# Patient Record
Sex: MALE | Race: WHITE | Employment: FULL TIME | URBAN - METROPOLITAN AREA
[De-identification: names, ages, dates, MRNs, and addresses within clinical notes are randomized per-mention and may not be internally consistent; named-entity substitution may affect disease eponyms.]

---

## 2018-05-16 ENCOUNTER — TELEPHONE (OUTPATIENT)
Dept: FAMILY MEDICINE | Facility: CLINIC | Age: 68
End: 2018-05-16

## 2018-05-16 NOTE — TELEPHONE ENCOUNTER
Pt has apt 7/30  Would like to come in a week before for labs    Please call pt when the lab orders so he can make lab appt

## 2018-05-16 NOTE — TELEPHONE ENCOUNTER
With my new job at [employer], I have not been ordering pre-appt lab tests for patients that I have not seen at [facility].  For this patient, he should keep his planned evaluation appt and we can do labs on the day of his appt or another day (afterwards).  Thanks.    SHARON Riley MD  [facility]/[place]

## 2018-05-16 NOTE — TELEPHONE ENCOUNTER
Dr. Riley:    Would you like for patient to come into clinic for Lab Only appointment prior to being seen with you on 7/30/18?  Patient is requesting labs to be ordered.    Thank you!    Kat Barnes RN

## 2018-07-30 ENCOUNTER — OFFICE VISIT (OUTPATIENT)
Dept: ENDOCRINOLOGY | Facility: CLINIC | Age: 68
End: 2018-07-30
Payer: COMMERCIAL

## 2018-07-30 VITALS
DIASTOLIC BLOOD PRESSURE: 79 MMHG | BODY MASS INDEX: 25.76 KG/M2 | HEIGHT: 71 IN | WEIGHT: 184 LBS | HEART RATE: 62 BPM | SYSTOLIC BLOOD PRESSURE: 152 MMHG

## 2018-07-30 DIAGNOSIS — E78.1 HYPERTRIGLYCERIDEMIA: Primary | ICD-10-CM

## 2018-07-30 DIAGNOSIS — R73.01 IFG (IMPAIRED FASTING GLUCOSE): ICD-10-CM

## 2018-07-30 DIAGNOSIS — Z86.39 HISTORY OF HYPERPARATHYROIDISM: ICD-10-CM

## 2018-07-30 PROCEDURE — 99214 OFFICE O/P EST MOD 30 MIN: CPT | Performed by: INTERNAL MEDICINE

## 2018-07-30 RX ORDER — CETIRIZINE HYDROCHLORIDE 10 MG/1
10 TABLET ORAL
COMMUNITY

## 2018-07-30 RX ORDER — ALBUTEROL SULFATE 90 UG/1
2 AEROSOL, METERED RESPIRATORY (INHALATION) EVERY 6 HOURS
COMMUNITY

## 2018-07-30 NOTE — PROGRESS NOTES
Name: Remberto Montague is a 68 year old man, self-referred for evaluation of hyperlipidemia  Previously seen by me at my former clinic ([facility]), here to establish care with [facility].    HPI:  Recent issues:  Here for f/u endocrinology evaluation  Feeling well overall, but recalls having a prostate nodule biopsy 2017, benign result        Parathyroid:  History of hyperparathyroidism (HPT)  ... Evaluation, lab tests indicated HPT  Previous history kidney stones:  none  1992. Parathyroid surgery at [facility]  Subsequent calcium and PTH levels normal  Previous [facility] chemistry labs include:          Hyperlipidemia:  History of hypertriglyceridemia  Has taken gemfibrozil (Lopid) and also tried fenofibrate  History of vascular disease:  None  Previous pancreatitis:  None  History of diabetes:  None, but previous IFG  Fam Hx vascular disease:  Sister?  No lipid med use for several years  Recent [facility] lipid labs include:        Had seen Deandre Yun, caesar Moody[facility]   Now sees Dr. JOSÉ MIGUEL Sidhu[facility] for gen med evaluations    PMH/PSH:  Past Medical History:   Diagnosis Date     Backache, unspecified      Other and unspecified hyperlipidemia      Tobacco use disorder      No past surgical history on file.    Family Hx:  Family History   Problem Relation Age of Onset     Cancer Mother      Cancer Father      HEART DISEASE Paternal Grandmother      Cancer - colorectal Paternal Grandfather      Diabetes Sister      HEART DISEASE Sister          Social Hx:  Social History     Social History     Marital status: Single     Spouse name: N/A     Number of children: N/A     Years of education: N/A     Occupational History     Not on file.     Social History Main Topics     Smoking status: Former Smoker     Quit date: 7/19/1998     Smokeless tobacco: Never Used     Alcohol use Yes      Comment: occasionally     Drug use: No     Sexual activity: Yes  "    Other Topics Concern     Parent/Sibling W/ Cabg, Mi Or Angioplasty Before 65f 55m? Yes     sister, paternal grandmother     Social History Narrative          MEDICATIONS:  has a current medication list which includes the following prescription(s): albuterol, alfuzosin, aspirin, cetirizine, vitamin d, flonase, and prilosec otc.    ROS:     ROS: 10 point ROS neg other than the symptoms noted above in the HPI.    GENERAL: no fatigue, wt stable; denies fevers, chills, night sweats.   HEENT: no dysphagia, odonophagia, diplopia, neck pain  THYROID:  no apparent hyper or hypothyroid symptoms  CV: no chest pain, pressure, palpitations  LUNGS: no SOB, LAROSE, cough, wheezing   ABDOMEN: no diarrhea, constipation, abdominal pain  EXTREMITIES: no rashes, ulcers, edema  NEUROLOGY: no headaches, denies changes in vision, tingling, extremitiy numbness   MSK: no muscle aches or pains, weakness  SKIN: no rashes or lesions  : occasional nocturia  PSYCH:  stable mood, no significant anxiety or depression  ENDOCRINE: no heat or cold intolerance    Physical Exam   VS: /79 (BP Location: Right arm, Cuff Size: Adult Regular)  Pulse 62  Ht 1.791 m (5' 10.5\")  Wt 83.5 kg (184 lb)  BMI 26.03 kg/m2  GENERAL: AXOX3, NAD, well dressed, answering questions appropriately, appears stated age.  THYROID:  normal gland, no apparent nodules or goiter  HEENT: neck non-tender, no exopthalmous, no proptosis, EOMI  CV: RRR, no rubs, gallops, no murmurs  LUNGS: CTAB, no wheezes, rales, or ronchi  ABDOMEN: soft, nontender, nondistended  EXTREMITIES: no edema, +pedal pulses, no lesions  NEUROLOGY: CN grossly intact, + DTR lower extremity, no tremors  MSK: grossly intact  SKIN: facial beard; no rashes, no lesions    LABS:    All pertinent notes, labs, and images personally reviewed by me.     A/P:  Encounter Diagnoses   Name Primary?     Hypertriglyceridemia Yes     History of hyperparathyroidism      IFG (impaired fasting glucose)  "       Comments:  Reviewed health history and hyperlipidemia issues.    Plan:  Discussed general issues with the hyperlipidemia diagnosis and management  We have discussed fasting lab tests used to assess patient lipid levels  We discussed potential vascular health risks of hypercholesterolemia  Discussed importance of screening for hyperglycemia and T2DM  Reviewed medication treatment options, dosing, potential medication side effects    Recommend:  Continue diet, exercise, weight management plan  Needs future lab appt for fasting lab tests  Would not start lipid lowering or diabetes medication at this time  Contact our office or PCP if questions about evaluation and management     Patient to follow up with Primary Care provider regarding elevated blood pressure.  Addressed patient questions today    Future labs ordered today:   Orders Placed This Encounter   Procedures     Hemoglobin A1c     Glucose     Lipid panel reflex to direct LDL Fasting     Calcium     Parathyroid Hormone Intact     Radiology/Consults ordered today: None    More than 50% of the time spent with Mr. Montague on counseling / coordinating his care.  Total appointment time was 30 minutes.    Follow-up:  1 yr    James Riley MD  Endocrinology  [facility]

## 2018-07-30 NOTE — MR AVS SNAPSHOT
After Visit Summary   7/30/2018    Remberto Montague    MRN: [id number]           Patient Information     Date Of Birth          1950        Visit Information        Provider Department      7/30/2018 2:00 PM James Riley MD [facility]        Today's Diagnoses     Hypertriglyceridemia    -  1    History of hyperparathyroidism        IFG (impaired fasting glucose)           Follow-ups after your visit        Follow-up notes from your care team     Return in about 1 year (around 7/30/2019).      Your next 10 appointments already scheduled     Aug 02, 2018  8:00 AM CDT   LAB with [facility]   [facility] ([facility]    [address] [address] [phone]           Please do not eat 10-12 hours before your appointment if you are coming in fasting for labs on lipids, cholesterol, or glucose (sugar). This does not apply to pregnant women. Water, hot tea and black coffee (with nothing added) are okay. Do not drink other fluids, diet soda or chew gum.              Future tests that were ordered for you today     Open Future Orders        Priority Expected Expires Ordered    Hemoglobin A1c Routine  7/30/2019 7/30/2018    Glucose Routine  7/30/2019 7/30/2018    Lipid panel reflex to direct LDL Fasting Routine  7/30/2019 7/30/2018    Calcium Routine  7/30/2019 7/30/2018    Parathyroid Hormone Intact Routine  7/30/2019 7/30/2018            Who to contact     If you have questions or need follow up information about today's clinic visit or your schedule please contact [facility] directly at [phone].  Normal or non-critical lab and imaging results will be communicated to you by MyChart, letter or phone within 4 business days after the clinic has received the results. If you do not hear from us within 7 days, please contact the clinic through MyChart or phone. If you have a critical or abnormal lab result, we will notify you by  "phone as soon as possible.  Submit refill requests through [employer] or call your pharmacy and they will forward the refill request to us. Please allow 3 business days for your refill to be completed.          Additional Information About Your Visit        [employer] Information     [employer] lets you send messages to your doctor, view your test results, renew your prescriptions, schedule appointments and more. To sign up, go to [email or web address] . Click on \"Log in\" on the left side of the screen, which will take you to the Welcome page. Then click on \"Sign up Now\" on the right side of the page.     You will be asked to enter the access code listed below, as well as some personal information. Please follow the directions to create your username and password.     Your access code is: [email or web address]  Expires: 10/28/2018  1:31 PM     Your access code will  in 90 days. If you need help or a new code, please call your [place] clinic or [phone].        Care EveryWhere ID     This is your Care EveryWhere ID. This could be used by other organizations to access your [place] medical records  [id number]        Your Vitals Were     Pulse Height BMI (Body Mass Index)             62 1.791 m (5' 10.5\") 26.03 kg/m2          Blood Pressure from Last 3 Encounters:   18 152/79   10/31/11 122/80   11 132/74    Weight from Last 3 Encounters:   18 83.5 kg (184 lb)   10/31/11 81.6 kg (180 lb)   11 81.7 kg (180 lb 3.2 oz)               Primary Care Provider Office Phone # Fax #    Duran Barrett [phone]       [facility] [address] [place] [address]        Equal Access to Services     SERGIO PICHARDO : Akbar Jacobson, wasebas em, qaybta kaalari perrin, paola earl. [facility] [phone].    ATENCIÓN: Si habla español, tiene a vidales disposición servicios gratuitos de asistencia lingüística. Llame al " [phone].    We comply with applicable federal civil rights laws and [place] laws. We do not discriminate on the basis of race, color, national origin, age, disability, sex, sexual orientation, or gender identity.            Thank you!     Thank you for choosing [facility]  for your care. Our goal is always to provide you with excellent care. Hearing back from our patients is one way we can continue to improve our services. Please take a few minutes to complete the written survey that you may receive in the mail after your visit with us. Thank you!             Your Updated Medication List - Protect others around you: Learn how to safely use, store and throw away your medicines at www.disposemymeds.org.          This list is accurate as of 7/30/18  2:37 PM.  Always use your most recent med list.                   Brand Name Dispense Instructions for use Diagnosis    albuterol 108 (90 Base) MCG/ACT Inhaler    PROAIR HFA/PROVENTIL HFA/VENTOLIN HFA     Inhale 2 puffs into the lungs every 6 hours        alfuzosin 10 MG 24 hr tablet    UROXATRAL    30 tablet    Take  by mouth. 1 TABLET DAILY AFTER opal MEAL for prostate    Hypertrophy of prostate with urinary obstruction and other lower urinary tract symptoms (LUTS)       ASPIRIN 81 MG Cpep      1 CAPSULE DAILY        cetirizine 10 MG tablet    zyrTEC     Take 10 mg by mouth        FLONASE 50 MCG/DOSE nasal spray     1 Bottle    INHALE 2 SPRAYS IN EACH NOSTRIL ONCE DAILY    Allergic rhinitis due to other allergen       priLOSEC OTC 20 MG tablet   Generic drug:  omeprazole      1 TABLET DAILY        vitamin D 1000 units capsule      Take 1 capsule by mouth daily.

## 2018-08-02 DIAGNOSIS — R73.01 IFG (IMPAIRED FASTING GLUCOSE): ICD-10-CM

## 2018-08-02 DIAGNOSIS — Z86.39 HISTORY OF HYPERPARATHYROIDISM: ICD-10-CM

## 2018-08-02 DIAGNOSIS — E78.1 HYPERTRIGLYCERIDEMIA: ICD-10-CM

## 2018-08-02 LAB
CALCIUM SERPL-MCNC: 9 MG/DL (ref 8.5–10.1)
CHOLEST SERPL-MCNC: 140 MG/DL
GLUCOSE SERPL-MCNC: 108 MG/DL (ref 70–99)
HBA1C MFR BLD: 6 % (ref 0–5.6)
HDLC SERPL-MCNC: 29 MG/DL
LDLC SERPL CALC-MCNC: 69 MG/DL
NONHDLC SERPL-MCNC: 111 MG/DL
PTH-INTACT SERPL-MCNC: 40 PG/ML (ref 18–80)
TRIGL SERPL-MCNC: 210 MG/DL

## 2018-08-02 PROCEDURE — 83036 HEMOGLOBIN GLYCOSYLATED A1C: CPT | Performed by: INTERNAL MEDICINE

## 2018-08-02 PROCEDURE — 80061 LIPID PANEL: CPT | Performed by: INTERNAL MEDICINE

## 2018-08-02 PROCEDURE — 82947 ASSAY GLUCOSE BLOOD QUANT: CPT | Performed by: INTERNAL MEDICINE

## 2018-08-02 PROCEDURE — 83970 ASSAY OF PARATHORMONE: CPT | Performed by: INTERNAL MEDICINE

## 2018-08-02 PROCEDURE — 82310 ASSAY OF CALCIUM: CPT | Performed by: INTERNAL MEDICINE

## 2018-08-02 PROCEDURE — 36415 COLL VENOUS BLD VENIPUNCTURE: CPT | Performed by: INTERNAL MEDICINE

## 2019-10-01 ENCOUNTER — HEALTH MAINTENANCE LETTER (OUTPATIENT)
Age: 69
End: 2019-10-01

## 2019-12-15 ENCOUNTER — HEALTH MAINTENANCE LETTER (OUTPATIENT)
Age: 69
End: 2019-12-15

## 2021-01-15 ENCOUNTER — HEALTH MAINTENANCE LETTER (OUTPATIENT)
Age: 71
End: 2021-01-15

## 2021-09-04 ENCOUNTER — HEALTH MAINTENANCE LETTER (OUTPATIENT)
Age: 71
End: 2021-09-04

## 2021-10-06 ENCOUNTER — HOSPITAL ENCOUNTER (OUTPATIENT)
Dept: BONE DENSITY | Facility: CLINIC | Age: 71
End: 2021-10-06
Attending: INTERNAL MEDICINE
Payer: COMMERCIAL

## 2021-10-06 DIAGNOSIS — E21.0 PRIMARY HYPERPARATHYROIDISM (H): ICD-10-CM

## 2021-10-06 PROCEDURE — 77080 DXA BONE DENSITY AXIAL: CPT

## 2021-10-06 PROCEDURE — 77081 DXA BONE DENSITY APPENDICULR: CPT

## 2022-02-19 ENCOUNTER — HEALTH MAINTENANCE LETTER (OUTPATIENT)
Age: 72
End: 2022-02-19

## 2022-10-16 ENCOUNTER — HEALTH MAINTENANCE LETTER (OUTPATIENT)
Age: 72
End: 2022-10-16

## 2023-03-26 ENCOUNTER — HEALTH MAINTENANCE LETTER (OUTPATIENT)
Age: 73
End: 2023-03-26

## 2024-06-01 ENCOUNTER — HEALTH MAINTENANCE LETTER (OUTPATIENT)
Age: 74
End: 2024-06-01

## 2024-06-20 ENCOUNTER — LAB REQUISITION (OUTPATIENT)
Dept: LAB | Facility: CLINIC | Age: 74
End: 2024-06-20
Payer: COMMERCIAL

## 2024-06-20 DIAGNOSIS — L08.9 LOCAL INFECTION OF THE SKIN AND SUBCUTANEOUS TISSUE, UNSPECIFIED: ICD-10-CM

## 2024-06-20 PROCEDURE — 87070 CULTURE OTHR SPECIMN AEROBIC: CPT | Mod: ORL | Performed by: DERMATOLOGY

## 2024-06-22 LAB — BACTERIA WND CULT: NORMAL
